# Patient Record
Sex: MALE | Race: BLACK OR AFRICAN AMERICAN
[De-identification: names, ages, dates, MRNs, and addresses within clinical notes are randomized per-mention and may not be internally consistent; named-entity substitution may affect disease eponyms.]

---

## 2020-11-02 ENCOUNTER — HOSPITAL ENCOUNTER (EMERGENCY)
Dept: HOSPITAL 92 - ERS | Age: 85
LOS: 1 days | Discharge: NURSING, REHAB OR LONG-TERM CARE | End: 2020-11-03
Payer: MEDICARE

## 2020-11-02 ENCOUNTER — HOSPITAL ENCOUNTER (EMERGENCY)
Dept: HOSPITAL 92 - ERS | Age: 85
Discharge: HOME | End: 2020-11-02
Payer: MEDICARE

## 2020-11-02 DIAGNOSIS — S92.342A: ICD-10-CM

## 2020-11-02 DIAGNOSIS — S92.322A: Primary | ICD-10-CM

## 2020-11-02 DIAGNOSIS — I10: ICD-10-CM

## 2020-11-02 DIAGNOSIS — M79.671: ICD-10-CM

## 2020-11-02 DIAGNOSIS — S92.332A: ICD-10-CM

## 2020-11-02 DIAGNOSIS — E66.9: ICD-10-CM
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
## 2020-11-02 DIAGNOSIS — Z87.891: ICD-10-CM
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
## 2020-11-02 PROCEDURE — 99284 EMERGENCY DEPT VISIT MOD MDM: CPT

## 2020-11-02 PROCEDURE — 71045 X-RAY EXAM CHEST 1 VIEW: CPT

## 2020-11-02 PROCEDURE — 81003 URINALYSIS AUTO W/O SCOPE: CPT

## 2020-11-02 PROCEDURE — 81015 MICROSCOPIC EXAM OF URINE: CPT

## 2020-11-02 NOTE — RAD
RIGHT FOOT THREE VIEWS:

 

History: Fall

 

Comparison: None

 

FINDINGS: 

Mild demineralization. Lisfranc interval is maintained. 

 

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 

 

IMPRESSION: 

1. No acute displaced fracture. 

2. Advanced hindfoot degenerative change. 

3. Dorsal soft tissue swelling of the midfoot. 

 

POS: [facility]

## 2020-11-02 NOTE — RAD
EXAM: 

CHEST ONE VIEW



HISTORY:

Injury after fall on Friday night. Patient reports leg pain.



COMPARISON:

3/17/2016



FINDINGS:

Cardiac silhouette is magnified by projection. Pulmonary vasculature is within normal limits. There i
s mild elevation of the left hemidiaphragm with mild volume loss left lung base. Lungs are

otherwise clear. No other interval change.



IMPRESSION:

No acute cardiopulmonary process.



Reported By: Darren Burns 

Electronically Signed:  11/2/2020 3:42 PM

## 2020-11-02 NOTE — RAD
LEFT ANKLE 3 VIEWS:

 

Date:  11/02/2020

 

HISTORY:  

Fall. Left ankle pain. 

 

FINDINGS/IMPRESSION: 

The ankle mortise is maintained. No fracture or dislocation is seen in the bones of the left ankle. P
osterior and plantar calcaneal spurs are present. 

 

Incidental note is made of a fracture involving the proximal metaphysis of the fourth metatarsal. 

 

 

POS: [facility]

## 2020-11-02 NOTE — RAD
LEFT KNEE 4 VIEWS:

 

Date:  11/02/2020

 

HISTORY:  

Fall. Left knee pain. 

 

FINDINGS/IMPRESSION: 

Severe degenerative changes are present manifested by tricompartmental joint space narrowing and oste
ophyte formation. No acute fracture or dislocation identified. 

 

 

POS: [facility]

## 2020-11-02 NOTE — ULT
VENOUS DOPPLER ULTRASOUND OF THE LEFT LOWER EXTREMITY:

 

Date:  11/02/2020

 

HISTORY:  

Left lower extremity pain and edema. 

 

TECHNIQUE:  

Gray scale ultrasound with color flow and spectral Doppler imaging of the deep venous systems of the 
left lower extremity performed. 

 

FINDINGS:

There is good flow, compression, and augmentation noted in the left common femoral, femoral, deep fem
oral, popliteal, posterior tibial, and greater saphenous veins. 

 

Incidental note is made of 3.5 x 4.4 x 1.2 cm avascular cyst in the popliteal fossa consistent with a
 Baker's cyst. 

 

IMPRESSION: 

1.  No evidence of deep venous thrombosis in the left lower extremity. 

2.  Baker's cyst. 

 

 

POS: [facility]

## 2020-11-02 NOTE — RAD
LEFT FOOT 3 VIEWS:

 

Date:  11/02/2020

 

HISTORY:  

Fall. 

 

COMPARISON:  

None. 

 

FINDINGS:

Mild demineralization. There is a fracture of the second, third, and fourth metatarsal bases. Mild wi
dening of Lisfranc interval. 

 

Small capsular avulsion of the medial aspect of the great toe tarsometatarsal joint. 

 

IMPRESSION: 

1.  Fracture of the Lisfranc interval, including second, third, and fourth metatarsal bases with mild
 Lisfranc interval widening. There is also capsular injury of the great toe tarsometatarsal joint. Or
thopedic foot consultation advised. 

2.  Abnormal flatfoot deformity and talar neck foreshortening. Dedicated ankle radiograph recommended
. 

 

 

 

 

POS: [facility]

## 2020-11-03 LAB
PROT UR STRIP.AUTO-MCNC: 70 MG/DL
RBC UR QL AUTO: (no result) HPF (ref 0–3)
SP GR UR STRIP: 1.03 (ref 1–1.04)
WBC UR QL AUTO: (no result) HPF (ref 0–3)

## 2022-01-14 ENCOUNTER — HOSPITAL ENCOUNTER (OUTPATIENT)
Dept: HOSPITAL 92 - LABBT | Age: 87
Discharge: HOME | End: 2022-01-14
Attending: INTERNAL MEDICINE
Payer: MEDICARE

## 2022-01-14 DIAGNOSIS — Z20.822: ICD-10-CM

## 2022-01-14 DIAGNOSIS — R94.30: ICD-10-CM

## 2022-01-14 DIAGNOSIS — Z01.812: Primary | ICD-10-CM

## 2022-01-14 LAB
ALBUMIN SERPL BCG-MCNC: 4.3 G/DL (ref 3.4–4.8)
ALP SERPL-CCNC: 88 U/L (ref 40–110)
ALT SERPL W P-5'-P-CCNC: 19 U/L (ref 8–55)
ANION GAP SERPL CALC-SCNC: 15 MMOL/L (ref 10–20)
AST SERPL-CCNC: 21 U/L (ref 5–34)
BASOPHILS # BLD AUTO: 0.1 10X3/UL (ref 0–0.2)
BASOPHILS NFR BLD AUTO: 0.6 % (ref 0–2)
BILIRUB SERPL-MCNC: 0.4 MG/DL (ref 0.2–1.2)
BUN SERPL-MCNC: 26 MG/DL (ref 8.4–25.7)
CALCIUM SERPL-MCNC: 9.4 MG/DL (ref 7.8–10.44)
CHLORIDE SERPL-SCNC: 106 MMOL/L (ref 98–107)
CO2 SERPL-SCNC: 25 MMOL/L (ref 23–31)
CREAT CL PREDICTED SERPL C-G-VRATE: 0 ML/MIN (ref 70–130)
EOSINOPHIL # BLD AUTO: 0.5 10X3/UL (ref 0–0.5)
EOSINOPHIL NFR BLD AUTO: 4.8 % (ref 0–6)
GLOBULIN SER CALC-MCNC: 3.1 G/DL (ref 2.4–3.5)
GLUCOSE SERPL-MCNC: 117 MG/DL (ref 83–110)
HGB BLD-MCNC: 11 G/DL (ref 13.5–17.5)
LYMPHOCYTES NFR BLD AUTO: 26 % (ref 18–47)
MCH RBC QN AUTO: 30.7 PG (ref 27–33)
MCV RBC AUTO: 95.5 FL (ref 81.2–95.1)
MONOCYTES # BLD AUTO: 0.7 10X3/UL (ref 0–1.1)
MONOCYTES NFR BLD AUTO: 7.2 % (ref 0–10)
NEUTROPHILS # BLD AUTO: 6 10X3/UL (ref 1.5–8.4)
NEUTROPHILS NFR BLD AUTO: 61 % (ref 40–75)
PLATELET # BLD AUTO: 214 10X3/UL (ref 150–450)
POTASSIUM SERPL-SCNC: 4.2 MMOL/L (ref 3.5–5.1)
RBC # BLD AUTO: 3.58 10X6/UL (ref 4.32–5.72)
SODIUM SERPL-SCNC: 142 MMOL/L (ref 136–145)
WBC # BLD AUTO: 9.9 10X3/UL (ref 3.5–10.5)

## 2022-01-14 PROCEDURE — U0003 INFECTIOUS AGENT DETECTION BY NUCLEIC ACID (DNA OR RNA); SEVERE ACUTE RESPIRATORY SYNDROME CORONAVIRUS 2 (SARS-COV-2) (CORONAVIRUS DISEASE [COVID-19]), AMPLIFIED PROBE TECHNIQUE, MAKING USE OF HIGH THROUGHPUT TECHNOLOGIES AS DESCRIBED BY CMS-2020-01-R: HCPCS

## 2022-01-14 PROCEDURE — U0005 INFEC AGEN DETEC AMPLI PROBE: HCPCS

## 2022-01-14 PROCEDURE — 85025 COMPLETE CBC W/AUTO DIFF WBC: CPT

## 2022-01-14 PROCEDURE — 80053 COMPREHEN METABOLIC PANEL: CPT

## 2022-01-19 ENCOUNTER — HOSPITAL ENCOUNTER (OUTPATIENT)
Dept: HOSPITAL 92 - CCL | Age: 87
Discharge: HOME | End: 2022-01-19
Attending: INTERNAL MEDICINE
Payer: MEDICARE

## 2022-01-19 VITALS — BODY MASS INDEX: 41.3 KG/M2

## 2022-01-19 DIAGNOSIS — E78.5: ICD-10-CM

## 2022-01-19 DIAGNOSIS — R94.39: Primary | ICD-10-CM

## 2022-01-19 DIAGNOSIS — E78.00: ICD-10-CM

## 2022-01-19 DIAGNOSIS — I12.9: ICD-10-CM

## 2022-01-19 DIAGNOSIS — Z79.899: ICD-10-CM

## 2022-01-19 DIAGNOSIS — I25.10: ICD-10-CM

## 2022-01-19 DIAGNOSIS — G47.33: ICD-10-CM

## 2022-01-19 DIAGNOSIS — Z87.891: ICD-10-CM

## 2022-01-19 DIAGNOSIS — N18.31: ICD-10-CM

## 2022-01-19 DIAGNOSIS — I35.0: ICD-10-CM

## 2022-01-19 PROCEDURE — 99153 MOD SED SAME PHYS/QHP EA: CPT

## 2022-01-19 PROCEDURE — 99152 MOD SED SAME PHYS/QHP 5/>YRS: CPT

## 2022-01-19 PROCEDURE — 93460 R&L HRT ART/VENTRICLE ANGIO: CPT

## 2022-01-19 PROCEDURE — B2111ZZ FLUOROSCOPY OF MULTIPLE CORONARY ARTERIES USING LOW OSMOLAR CONTRAST: ICD-10-PCS | Performed by: INTERNAL MEDICINE

## 2022-01-19 PROCEDURE — 4A023N7 MEASUREMENT OF CARDIAC SAMPLING AND PRESSURE, LEFT HEART, PERCUTANEOUS APPROACH: ICD-10-PCS | Performed by: INTERNAL MEDICINE

## 2022-01-19 PROCEDURE — 85347 COAGULATION TIME ACTIVATED: CPT
